# Patient Record
Sex: MALE | Race: WHITE | Employment: FULL TIME | URBAN - NONMETROPOLITAN AREA
[De-identification: names, ages, dates, MRNs, and addresses within clinical notes are randomized per-mention and may not be internally consistent; named-entity substitution may affect disease eponyms.]

---

## 2022-07-07 ENCOUNTER — HOSPITAL ENCOUNTER (OUTPATIENT)
Dept: CT IMAGING | Age: 37
Discharge: HOME | End: 2022-07-09
Payer: COMMERCIAL

## 2022-07-07 DIAGNOSIS — R10.829 REBOUND ABDOMINAL TENDERNESS: ICD-10-CM

## 2022-07-07 DIAGNOSIS — K44.9 HIATAL HERNIA: ICD-10-CM

## 2022-07-07 PROCEDURE — 6360000004 HC RX CONTRAST MEDICATION

## 2022-07-07 PROCEDURE — 74177 CT ABD & PELVIS W/CONTRAST: CPT

## 2022-07-07 RX ADMIN — IOPAMIDOL 18 ML: 755 INJECTION, SOLUTION INTRAVENOUS at 19:08

## 2022-07-07 RX ADMIN — IOPAMIDOL 75 ML: 755 INJECTION, SOLUTION INTRAVENOUS at 19:08

## 2022-07-28 ENCOUNTER — HOSPITAL ENCOUNTER (OUTPATIENT)
Age: 37
Discharge: HOME | End: 2022-07-28
Payer: COMMERCIAL

## 2022-07-28 PROCEDURE — 87338 HPYLORI STOOL AG IA: CPT

## 2022-07-29 LAB
DIRECT EXAM: NEGATIVE
SPECIMEN DESCRIPTION: NORMAL

## 2024-10-25 ENCOUNTER — HOSPITAL ENCOUNTER (OUTPATIENT)
Age: 39
Discharge: HOME | End: 2024-10-27
Payer: COMMERCIAL

## 2024-10-25 ENCOUNTER — HOSPITAL ENCOUNTER (OUTPATIENT)
Age: 39
Setting detail: SPECIMEN
End: 2024-10-25
Payer: COMMERCIAL

## 2024-10-25 ENCOUNTER — HOSPITAL ENCOUNTER (OUTPATIENT)
Dept: GENERAL RADIOLOGY | Age: 39
Discharge: HOME | End: 2024-10-27
Payer: COMMERCIAL

## 2024-10-25 DIAGNOSIS — R52 PAIN: ICD-10-CM

## 2024-10-25 PROCEDURE — 72072 X-RAY EXAM THORAC SPINE 3VWS: CPT

## 2024-10-25 PROCEDURE — 72100 X-RAY EXAM L-S SPINE 2/3 VWS: CPT

## 2025-03-10 ENCOUNTER — OFFICE VISIT (OUTPATIENT)
Dept: ONCOLOGY | Age: 40
End: 2025-03-10
Payer: COMMERCIAL

## 2025-03-10 DIAGNOSIS — Z80.51 FAMILY HISTORY OF KIDNEY CANCER: ICD-10-CM

## 2025-03-10 DIAGNOSIS — Z80.0 FAMILY HISTORY OF PANCREATIC CANCER: Primary | ICD-10-CM

## 2025-03-10 DIAGNOSIS — Z80.3 FAMILY HISTORY OF BREAST CANCER: ICD-10-CM

## 2025-03-10 DIAGNOSIS — Z80.0 FAMILY HISTORY OF COLON CANCER: ICD-10-CM

## 2025-03-10 DIAGNOSIS — Z80.0 FAMILY HISTORY OF LYNCH SYNDROME: ICD-10-CM

## 2025-03-10 DIAGNOSIS — Z80.41 FAMILY HISTORY OF OVARIAN CANCER: ICD-10-CM

## 2025-03-10 PROCEDURE — 96041 GENETIC COUNSELING SVC EA 30: CPT | Performed by: GENETIC COUNSELOR, MS

## 2025-03-10 NOTE — PROGRESS NOTES
[facility] Genetic Counseling Program   Hereditary Cancer Risk Assessment     Name: Albaro Zambrano   YOB: 1985   Date of Consultation: 3/10/25   Referred by:   Shirlene Knowles APRN - NP  [address] [place] [id number]    Mr. Zambrano was seen at the [facility] for genetic counseling on 3/10/25.  Mr. Zambrano was referred by Shirlene Knowles APRN - NP due to his paternal family history of cancer due to a known MSH2 gene mutation / Tanner syndrome.     PERSONAL AND FAMILY HISTORY   Mr. Zambrano is a 39 y.o.  male with no personal history of cancer.     He has no major cancer risk factors. He did have an EGD completed several years ago, but has never had a colonoscopy.     He has two daughters (2.5y and 7 mos).     His paternal family history is significant for his father with renal cell carcinoma. His father recently underwent genetic testing and was found to carry a pathogenic mutation in the MSH2 gene. His father's sister (Mr. Zambrano's paternal aunt) had ovarian cancer in her 30s. A maternal first cousin had brain cancer and an unspecified female cancer and  at age 42. His paternal grandfather  of colon cancer at age 59. At least three great aunts had colon cancer at various ages.     His maternal family history is significant for a maternal uncle had pancreatic cancer. A  maternal first cousin had breast cancer in her late 20-early 30s. Another maternal uncle and his maternal grandmother had lung cancer.     His maternal half sister had an unspecified type of leukemia.     None of his maternal relatives have had genetic testing.     Mr. Zambrano reports unknown ancestry and there is no known Ashkenazi Mandaeism heritage.     RISK ASSESSMENT   We discussed that approximately 5-10% of cancers are due to a hereditary gene mutation which causes an increased risk for certain cancers.    Mr. Zambrano's father recently underwent genetic testing

## 2025-04-09 ENCOUNTER — TELEPHONE (OUTPATIENT)
Dept: ONCOLOGY | Age: 40
End: 2025-04-09

## 2025-04-09 NOTE — TELEPHONE ENCOUNTER
Left message for Albaro notifying him that his genetic test results are available. Requested that he return my phone call at his earliest convenience to review results.

## 2025-04-09 NOTE — TELEPHONE ENCOUNTER
[facility] Genetic Counseling Program   Hereditary Cancer Risk Assessment     Name: Albaro Zambrano  YOB: 1985  Date of Results Disclosure: ***     HISTORY   Mr. Zambrano was seen for genetic counseling at the request of *** due to his ***.  At that time, Mr. Zambrano chose to pursue genetic testing via the {Blank single:32162::\"xG+\",\"Multi-Cancer\",\"CancerNext Expanded + RNA\",\"Empower\",\"***\"} hereditary cancer gene panel. These results were discussed with Mr. Zambrano via telephone. A summary of Mr. Zambrano's results and recommendations are below.     RESULTS  ***    We discussed that Mr. Zambrano's negative test result greatly reduces the likelihood that his personal history of cancer is due to a hereditary mutation.  It is possible that his personal history of cancer may be due to a gene for which testing was not performed or which has yet to be discovered.     We discussed that Mr. Zambrano's negative test result greatly reduces the likelihood that he carries a hereditary gene mutation. However, it is possible that his family history of cancer is due to a hereditary mutation which he did not inherit.  It is also possible that his family history of cancer may be due to a gene for which testing was not performed or which has yet to be discovered.     RECOMMENDATIONS  1) The outcome of Mr. Zambrano's genetic test results do not affect his current cancer treatment. Mr. Zambrano should continue cancer treatment and surveillance as directed by his physicians.    1) While Mr. Zambrano does not carry a known hereditary gene mutation, his risk for breast cancer may still be elevated. Based on Mr. Zambrano's personal risk factors and family history of breast cancer, his estimated lifetime risk for breast cancer is ***% according to the Tyrer Cuzick risk model.     The National Comprehensive Cancer Network (NCCN) recommends that women with a lifetime risk of breast cancer 20% or higher consider the following